# Patient Record
Sex: FEMALE | Race: ASIAN | Employment: STUDENT | URBAN - METROPOLITAN AREA
[De-identification: names, ages, dates, MRNs, and addresses within clinical notes are randomized per-mention and may not be internally consistent; named-entity substitution may affect disease eponyms.]

---

## 2019-11-23 ENCOUNTER — HOSPITAL ENCOUNTER (EMERGENCY)
Facility: CLINIC | Age: 24
Discharge: HOME | End: 2019-11-24
Attending: EMERGENCY MEDICINE | Admitting: EMERGENCY MEDICINE
Payer: COMMERCIAL

## 2019-11-23 DIAGNOSIS — L50.9 URTICARIA: ICD-10-CM

## 2019-11-23 LAB
ALBUMIN SERPL-MCNC: 3.8 G/DL (ref 3.4–5)
ALP SERPL-CCNC: 37 U/L (ref 40–150)
ALT SERPL W P-5'-P-CCNC: 19 U/L (ref 0–50)
ANION GAP SERPL CALCULATED.3IONS-SCNC: 5 MMOL/L (ref 3–14)
AST SERPL W P-5'-P-CCNC: 17 U/L (ref 0–45)
BASOPHILS # BLD AUTO: 0 10E9/L (ref 0–0.2)
BASOPHILS NFR BLD AUTO: 0.2 %
BILIRUB SERPL-MCNC: 0.2 MG/DL (ref 0.2–1.3)
BUN SERPL-MCNC: 17 MG/DL (ref 7–30)
CALCIUM SERPL-MCNC: 9.2 MG/DL (ref 8.5–10.1)
CHLORIDE SERPL-SCNC: 106 MMOL/L (ref 94–109)
CO2 SERPL-SCNC: 25 MMOL/L (ref 20–32)
CREAT SERPL-MCNC: 0.94 MG/DL (ref 0.52–1.04)
CRP SERPL-MCNC: 8.1 MG/L (ref 0–8)
DIFFERENTIAL METHOD BLD: ABNORMAL
EOSINOPHIL # BLD AUTO: 7.2 10E9/L (ref 0–0.7)
EOSINOPHIL NFR BLD AUTO: 38.2 %
ERYTHROCYTE [DISTWIDTH] IN BLOOD BY AUTOMATED COUNT: 13.8 % (ref 10–15)
ERYTHROCYTE [SEDIMENTATION RATE] IN BLOOD BY WESTERGREN METHOD: 7 MM/H (ref 0–20)
GFR SERPL CREATININE-BSD FRML MDRD: 84 ML/MIN/{1.73_M2}
GLUCOSE SERPL-MCNC: 91 MG/DL (ref 70–99)
HCT VFR BLD AUTO: 43.2 % (ref 35–47)
HGB BLD-MCNC: 14.3 G/DL (ref 11.7–15.7)
IMM GRANULOCYTES # BLD: 0.1 10E9/L (ref 0–0.4)
IMM GRANULOCYTES NFR BLD: 0.7 %
LYMPHOCYTES # BLD AUTO: 4.1 10E9/L (ref 0.8–5.3)
LYMPHOCYTES NFR BLD AUTO: 21.5 %
MCH RBC QN AUTO: 29.2 PG (ref 26.5–33)
MCHC RBC AUTO-ENTMCNC: 33.1 G/DL (ref 31.5–36.5)
MCV RBC AUTO: 88 FL (ref 78–100)
MONOCYTES # BLD AUTO: 0.5 10E9/L (ref 0–1.3)
MONOCYTES NFR BLD AUTO: 2.4 %
NEUTROPHILS # BLD AUTO: 7 10E9/L (ref 1.6–8.3)
NEUTROPHILS NFR BLD AUTO: 37 %
NRBC # BLD AUTO: 0 10*3/UL
NRBC BLD AUTO-RTO: 0 /100
PLATELET # BLD AUTO: 280 10E9/L (ref 150–450)
POTASSIUM SERPL-SCNC: 3.9 MMOL/L (ref 3.4–5.3)
PROT SERPL-MCNC: 7.6 G/DL (ref 6.8–8.8)
RBC # BLD AUTO: 4.9 10E12/L (ref 3.8–5.2)
SODIUM SERPL-SCNC: 137 MMOL/L (ref 133–144)
WBC # BLD AUTO: 18.9 10E9/L (ref 4–11)

## 2019-11-23 PROCEDURE — 25000132 ZZH RX MED GY IP 250 OP 250 PS 637: Performed by: EMERGENCY MEDICINE

## 2019-11-23 PROCEDURE — 99284 EMERGENCY DEPT VISIT MOD MDM: CPT | Mod: 25 | Performed by: EMERGENCY MEDICINE

## 2019-11-23 PROCEDURE — 80053 COMPREHEN METABOLIC PANEL: CPT | Performed by: EMERGENCY MEDICINE

## 2019-11-23 PROCEDURE — 76882 US LMTD JT/FCL EVL NVASC XTR: CPT | Mod: 26 | Performed by: EMERGENCY MEDICINE

## 2019-11-23 PROCEDURE — 76882 US LMTD JT/FCL EVL NVASC XTR: CPT | Performed by: EMERGENCY MEDICINE

## 2019-11-23 PROCEDURE — 86140 C-REACTIVE PROTEIN: CPT | Performed by: EMERGENCY MEDICINE

## 2019-11-23 PROCEDURE — 85025 COMPLETE CBC W/AUTO DIFF WBC: CPT | Performed by: EMERGENCY MEDICINE

## 2019-11-23 PROCEDURE — 85652 RBC SED RATE AUTOMATED: CPT | Performed by: EMERGENCY MEDICINE

## 2019-11-23 RX ORDER — CLONAZEPAM 0.5 MG/1
0.5 TABLET ORAL 2 TIMES DAILY PRN
COMMUNITY

## 2019-11-23 RX ORDER — HYDROXYZINE HYDROCHLORIDE 25 MG/1
25 TABLET, FILM COATED ORAL ONCE
Status: COMPLETED | OUTPATIENT
Start: 2019-11-23 | End: 2019-11-23

## 2019-11-23 RX ORDER — CYCLOBENZAPRINE HCL 5 MG
5 TABLET ORAL 3 TIMES DAILY PRN
COMMUNITY

## 2019-11-23 RX ORDER — DESONIDE 0.5 MG/G
CREAM TOPICAL 2 TIMES DAILY
COMMUNITY

## 2019-11-23 RX ADMIN — HYDROXYZINE HYDROCHLORIDE 25 MG: 25 TABLET, FILM COATED ORAL at 22:30

## 2019-11-23 ASSESSMENT — ENCOUNTER SYMPTOMS
SHORTNESS OF BREATH: 0
APPETITE CHANGE: 0
FEVER: 0
VOMITING: 0
ABDOMINAL PAIN: 0
NAUSEA: 0
CHILLS: 0
ACTIVITY CHANGE: 0
NUMBNESS: 0
DIARRHEA: 0

## 2019-11-23 NOTE — ED AVS SNAPSHOT
[facility], [place], Emergency Department  [address] [facility] [address]  Phone:  [phone]                                    Koko Daniel   MRN: [id number]    Department:  [facility], Emergency Department   Date of Visit:  11/23/2019           After Visit Summary Signature Page    I have received my discharge instructions, and my questions have been answered. I have discussed any challenges I see with this plan with the nurse or doctor.    ..........................................................................................................................................  Patient/Patient Representative Signature      ..........................................................................................................................................  Patient Representative Print Name and Relationship to Patient    ..................................................               ................................................  Date                                   Time    ..........................................................................................................................................  Reviewed by Signature/Title    ...................................................              ..............................................  Date                                               Time          22EPIC Rev 08/18

## 2019-11-24 ENCOUNTER — TELEPHONE (OUTPATIENT)
Dept: DERMATOLOGY | Facility: CLINIC | Age: 24
End: 2019-11-24

## 2019-11-24 VITALS
HEART RATE: 74 BPM | OXYGEN SATURATION: 100 % | TEMPERATURE: 98.6 F | RESPIRATION RATE: 16 BRPM | WEIGHT: 146 LBS | SYSTOLIC BLOOD PRESSURE: 118 MMHG | DIASTOLIC BLOOD PRESSURE: 75 MMHG

## 2019-11-24 RX ORDER — HYDROXYZINE HYDROCHLORIDE 25 MG/1
25 TABLET, FILM COATED ORAL EVERY 8 HOURS PRN
Qty: 15 TABLET | Refills: 0 | Status: SHIPPED | OUTPATIENT
Start: 2019-11-24

## 2019-11-24 NOTE — DISCHARGE INSTRUCTIONS
Please make an appointment to follow up with Dermatology Clinic (phone: [phone]) this week for recheck and possible biopsy.  They will call you to schedule.      You may use hydroxyzine as needed for itching.  This may make you drowsy so use with caution during the day and do not drive after taking.  You may alternatively use cetirizine during the day for itching and hydroxyzine in the evening.      Apply topical Sarna cream followed by eucerin to keep skin well hydrated.  Avoid any soaps or detergents with perfumes or dyes.      If you have any worsening symptoms including increasing rash, fever, shortness of breath, or other concerns, return to the emergency department for re-evaluation.

## 2019-11-24 NOTE — TELEPHONE ENCOUNTER
As resident on call, I received a page from the ED provider Dr. Lo Allen regarding Ms. Daniel, a 25 yo female presenting to the ED for a rash, concern for urticarial vasculitis. Communication via telephone, with the following history obtained from  ED provider and chart review. The patient has a history of eczema but today developed urticarial appearing plaques on the bilateral lower legs that the patient described as painful and burning. Patient had no signs/symptoms of mucositis, facial swelling, or diffuse rash elsewhere. Patient did not endorse any other systemic symptoms, including abdominal pain. She was otherwise in her normal state of health. Patient was afebrile, vitals signs stable. CBC, CMP, CRP, ESR remarkable only for WBC 18.9 with absolute eosinophilia. Ultrasound of soft tissue pending. Hydroxyzine administered in ED with improvement of rash. For the rash, I recommended follow up in dermatology clinic early next week (request placed) and continuation of oral antihistamines. No signs of dermatologic condition requiring urgent dermatologic evaluation based on information provided. Deferred other work-up, treatment and plan to ED provider. Recommend patient return to the ED if patient develops signs of mucositis, facial swelling, or other systemic symptoms.    Marina Malcolm PGY2  Dermatology Resident  pager

## 2019-11-24 NOTE — ED PROVIDER NOTES
[facility] EMERGENCY DEPARTMENT ([facility])  11/23/19   History     Chief Complaint   Patient presents with     Hives     The history is provided by the patient and medical records.     Koko Daniel is a 24 year old female who has a PMHx of eczema, who presents to the Emergency Department for evaluation of diffuse pruritis and lower extremity rash.  Patient has had a hives symptoms for approximately 1.5 weeks starting with itchiness of left leg (also had few weeks of left sole itchiness). On 11/19/19, she noted a rash on her left leg developing and that night was unable to sleep due to itchiness. The next day patient tried benadryl which was helpful in managing her itching initially. However, her rash continued to develop and has spread as it is now also present on right leg. Today the rash on the lower legs with more painful prompted her ED visit.  Has not noted mouth sores, tongue swelling, shortness of breath, nausea or vomiting, or abdominal pain. No fevers or chills.  Denies any rhinorrhea, sore throat or cough.  Denies any dysuria, frequency, hematuria or blood in the stools. She is able to fully range the joints of the lower extremity.      History of hives as a child and reports her eosinophil count was quite high at the time (per sister's report) without clear etiology. 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 She did try a new lotion for eczema recently but states hives were present before she started using this lotion. Has also started using more of a new unscented detergent from [employer].  Patient has not had new medication changes and continues to take: cyclobenzaprine for TMJ, OCP and is prescribed clonazepam for anxiety.  She does live in an apartment with her fiance who has not had any similar rash.      I have reviewed the Medications, Allergies, Past Medical and Surgical History,  and Social History in the Epic system.    History reviewed. No pertinent past medical history.    No past surgical history on file.    No family history on file.    Social History     Tobacco Use     Smoking status: Not on file   Substance Use Topics     Alcohol use: Not on file       No current facility-administered medications for this encounter.      Current Outpatient Medications   Medication     clonazePAM (KLONOPIN) 0.5 MG tablet     cyclobenzaprine (FLEXERIL) 5 MG tablet     desonide (DESOWEN) 0.05 % external cream     norethin-eth estradiol-fe (GILDESS 24 FE) 1-20 MG-MCG(24) tablet      No Known Allergies     Review of Systems   Constitutional: Negative for activity change, appetite change, chills and fever.   HENT: Negative for mouth sores.    Respiratory: Negative for shortness of breath.    Cardiovascular: Negative for chest pain.   Gastrointestinal: Negative for abdominal pain, diarrhea, nausea and vomiting.   Skin: Positive for rash (lower extremities (L>R)).   Neurological: Negative for numbness.   All other systems reviewed and are negative.      Physical Exam   /75   Pulse 74   Temp 98.6  F (37  C) (Oral)   Resp 16   Wt 66.2 kg (146 lb)   LMP 11/02/2019 (Exact Date)   SpO2 100%       Physical Exam  General: patient is alert and oriented, well apparing and in no acute distress   Head: atraumatic and normocephalic   EENT: moist mucus membranes without tonsillar erythema or exudates, no oral lesions or ulceration, no tongue swelling, pupils round and reactive, no injection of conjunctiva  Neck: supple with full ROM  Cardiovascular: regular rate and rhythm, extremities warm and well perfused, no lower extremity edema, 2+ DP pulses bilaterally  Pulmonary: lungs clear to auscultation bilaterally   Abdomen: soft, non-tender, non-distended  Musculoskeletal: normal range of motion of bilateral hips, knees and ankles without joint swelling or redness  Neurological: alert and oriented, moving all  extremities symmetrically, gait normal   Skin: warm, dry, numerous excoriations on the back, multiple small blanchable macules and papules on the upper back, left upper abdomen and bilateral lower extremities with confluence in the distal lower extremities                            ED Course   9:35 PM  The patient was seen and examined by Lo Allen MD in Room ED05.     10:02 PM Patient rash also evaluated by Dr. Morin.      Procedures             Critical Care time:  none             Labs Ordered and Resulted from Time of ED Arrival Up to the Time of Departure from the ED - No data to display         Assessments & Plan (with Medical Decision Making)   This is a 24-year-old female with a history of anxiety, eczema and TMJ who presents to the emergency department with concern for a rash.  She is hemodynamically stable, afebrile and well appearing.  She does have blanching raised erythema on the bilateral lower extremities as well as a  lesions on her back and abdomen.  She has been using a new lotion recently from [employer] due to have itching.  She subsequently developed hives following this.  Certainly this may have been a trigger versus being under a significant amount of stress recently versus weather changes. She does however report that the rash is somewhat painful and potential for urticarial vasculitis is a consideration.  She did have baseline labs which show her LFTs and creatinine are WNL.  She does have an elevated WBC with a significant eosinophil predominance.  The remainder of her inflammatory markers are not significantly elevated.  On exam she does have diffuse symptoms bilateral lower extremity symptoms that does not appear consistent with a secondary cellulitis or necrotizing infection.   She does not have any systemic signs of infectious symptoms and does not have any systemic signs of allergic reaction.  Denies any other localizing infectious symptoms to trigger rash.  She is able to fully range  all joints with low suspicion for septic joint.  She was given hydroxyzine and observed in the ED without any progression of the distal lower extremity rash and actually appears improved on re-evaluation.  I did discuss with dermatology given the potential for an urticarial vasculitis.  At this time recommended holding steroids and will plan to see patient this week in clinic.  Will continue treatment with antihistamines and Sarna cream followed by this emollient.  She was given close return precautions for the emergency department is she should develop progressive symptoms, fevers, or any other worsening symptoms and voiced understanding.    I have reviewed the nursing notes.    I have reviewed the findings, diagnosis, plan and need for follow up with the patient.    Discharge Medication List as of 11/24/2019 12:20 AM      START taking these medications    Details   hydrOXYzine (ATARAX) 25 MG tablet Take 1 tablet (25 mg) by mouth every 8 hours as needed for itching, Disp-15 tablet, R-0, Local Print             Final diagnoses:   Urticaria     Richie BURNETTE, am serving as a trained medical scribe to document services personally performed by Lo Allen MD, based on the provider's statements to me.   Lo BURNETTE MD, was physically present and have reviewed and verified the accuracy of this note documented by Richie Tomas.     11/23/2019   [facility], [place], EMERGENCY DEPARTMENT     Lo Allen MD  11/24/19 0240

## 2019-11-24 NOTE — ED TRIAGE NOTES
Pt has a rash on her left lower leg and upper thigh that she says is spreading up her leg. She says it hurts and is warm and itchy. She has a hx of eczema.  She identifies areas of discoloration that are not normal. She has tried benadryl and lotion for eczema. She is alert oriented and ambulatory. On Tuesday 11/19 the hives got so itchy it woke her up so she tried benadryl and it helped at first

## 2019-11-26 ENCOUNTER — OFFICE VISIT (OUTPATIENT)
Dept: DERMATOLOGY | Facility: CLINIC | Age: 24
End: 2019-11-26
Payer: COMMERCIAL

## 2019-11-26 DIAGNOSIS — L50.9 URTICARIA: ICD-10-CM

## 2019-11-26 DIAGNOSIS — L50.9 URTICARIA: Primary | ICD-10-CM

## 2019-11-26 LAB — TSH SERPL DL<=0.005 MIU/L-ACNC: 2.64 MU/L (ref 0.4–4)

## 2019-11-26 ASSESSMENT — PAIN SCALES - GENERAL: PAINLEVEL: NO PAIN (0)

## 2019-11-26 NOTE — LETTER
11/26/2019       RE: Koko Daniel  [address]  [place] [address]     Dear Colleague,    Thank you for referring your patient, Koko Daniel, to the [facility] DERMATOLOGY at [facility]. Please see a copy of my visit note below.    [facility] Dermatology Note      Dermatology Problem List:  1. Hx eczema  2. Acute urticaria  - Allegra 180 mg bid  - Zyrtec 10 mg bid    Encounter Date: Nov 26, 2019    CC:   No chief complaint on file.        History of Present Illness:  Ms. Koko Daniel is a 24 year old female with history of anxiety and eczema who presents for evaluation of painful urticarial rash. On 11/2319, the patient presented to the ED for new onset rash. On 11/19/19, she noted a rash on her left leg developing and that night was unable to sleep due to itchiness. The next day patient tried benadryl which was helpful in managing her itching initially. However, her rash continued to develop and has spread as it is now also present on right leg.  The rash on the lower legs with more painful prompted her ED visit on 11/23. She had a few urticarial lesions on her trunk as well. In the ED, she was afebrile and hemodynamically stable.  Her WBC was elevated to 18.9 with absolute neutrophilia of 7.2. LFT's and CR wnl in ED.  She denied other systemic symptoms in the ED. Hive-like rash thought to be triggered by new lotion vs stress vs weather changes; however associated pain raised concern for urticarial vasculitis per ED provider. She was given hydroxyzine in the ED with improvement in the rash. Patient was discharged from the ED with prescription for hydroxyzine and desonide with plan for close follow up in dermatology clinic.    Patient denies painful lesions today. She states that lesions that were painful on her lower extremities resolved in <24 hours with hydroxyzine without recurrence. She has had lesions that persisted for >24 hours on her  "arms.  She does endorse some swelling and \"fullness\" of the ankles.    No history of lupus, Hep B/C. No new medications aside from addition of flexeril recent within past few months. Denies recent illness.    Patient denies facial swelling, ocular, nasal and oral sores.    Denies other allergies. Hx eczema, asthma and high eosinophils per patient report.    Past Medical History:   There is no problem list on file for this patient.    No past medical history on file.  No past surgical history on file.    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     Family History:  No family history on file.    Medications:  Current Outpatient Medications   Medication Sig Dispense Refill     clonazePAM (KLONOPIN) 0.5 MG tablet Take 0.5 mg by mouth 2 times daily as needed for anxiety       cyclobenzaprine (FLEXERIL) 5 MG tablet Take 5 mg by mouth 3 times daily as needed for muscle spasms       desonide (DESOWEN) 0.05 % external cream Apply topically 2 times daily       hydrOXYzine (ATARAX) 25 MG tablet Take 1 tablet (25 mg) by mouth every 8 hours as needed for itching 15 tablet 0     norethin-eth estradiol-fe (GILDESS 24 FE) 1-20 MG-MCG(24) tablet Take 1 tablet by mouth daily          No Known Allergies      Review of Systems:  -Skin Establ Pt: The patient denies any new rash, pruritus, or lesions that are symptomatic, changing or bleeding, except as per HPI.  -Constitutional: Otherwise feeling well today, in usual state of health.  -HEENT: Patient denies nonhealing oral sores.  -Skin: As above in HPI. No additional skin concerns.    Physical exam:  Vitals: [facility] 11/02/2019 (Exact Date)   GEN: This is a well developed, well-nourished female in no acute distress, in a pleasant mood.    SKIN: A focused skin exam was performed. The exam included the head/face, neck, both arms, chest, back, abdomen, both legs, digits and/or nails.   -Cochran skin type: III  -On the lower legs and forearms there are few " very faint pink patches  -On the right dorsal hand/forearm and dorsal left foot there are eczematous patches  -No active urticarial lesions  -No appreciable swelling or erythema in joints  -No other lesions of concern on areas examined.     Impression/Plan:  1. Acute urticaria: Patient's presentation most consistent with acute urticaria as vast majority of lesions are preset <24 hours and are not painful. Urticarial vasculitis or neutrophilic urticaria possible, but not high suspicion for this diagnosis at this time as lesions not persistent, ESR/CRP from recent ED visit not elevated, no joint pain, no fevers. No hx of triggering underlying disease such as lupus, HepB/C. No active lesions on exam of today. Discussed with patient the etiology, natural coarse and treatment options, including that in the vast majorty of people with urticaria, it will resolve spontaneously in 6 weeks. Discussed common triggers including medications, infections, alcohol use, foods, and cold weather. No identifiable trigger today.     Check TSH    Instructed patient to take Allegra 180 mg two times per day with Zyrtec 10 mg two times per day (ok to reduce to daily if noting excessive sedation)    Discussed with patient that aspirin/NSAIDS may be potentiating the hives, but still okay to take    Consider biopsy at next appointment, if no improvement        CC Md [facility], MD  [address] [place] [address] on close of this encounter.    Follow-up in 3-4 weeks    Dr. Mcintyre staffed the patient.    Staff Involved:  Resident(Marina Malcolm PGY2)/Staff    I have seen and examined this patient and agree with the assessment and plan as documented in the resident's note.    Kamari Mcintyre MD  Dermatology Attending

## 2019-11-26 NOTE — PROGRESS NOTES
"[facility] Dermatology Note      Dermatology Problem List:  1. Hx eczema  2. Acute urticaria  - Allegra 180 mg bid  - Zyrtec 10 mg bid    Encounter Date: Nov 26, 2019    CC:   No chief complaint on file.        History of Present Illness:  Ms. Koko Daniel is a 24 year old female with history of anxiety and eczema who presents for evaluation of painful urticarial rash. On 11/2319, the patient presented to the ED for new onset rash. On 11/19/19, she noted a rash on her left leg developing and that night was unable to sleep due to itchiness. The next day patient tried benadryl which was helpful in managing her itching initially. However, her rash continued to develop and has spread as it is now also present on right leg. The rash on the lower legs with more painful prompted her ED visit on 11/23. She had a few urticarial lesions on her trunk as well. In the ED, she was afebrile and hemodynamically stable.  Her WBC was elevated to 18.9 with absolute neutrophilia of 7.2. LFT's and CR wnl in ED.  She denied other systemic symptoms in the ED. Hive-like rash thought to be triggered by new lotion vs stress vs weather changes; however associated pain raised concern for urticarial vasculitis per ED provider. She was given hydroxyzine in the ED with improvement in the rash. Patient was discharged from the ED with prescription for hydroxyzine and desonide with plan for close follow up in dermatology clinic.    Patient denies painful lesions today. She states that lesions that were painful on her lower extremities resolved in <24 hours with hydroxyzine without recurrence. She has had lesions that persisted for >24 hours on her arms.  She does endorse some swelling and \"fullness\" of the ankles.    No history of lupus, Hep B/C. No new medications aside from addition of flexeril recent within past few months. Denies recent illness.    Patient denies facial swelling, ocular, nasal and oral sores.    Denies other " allergies. Hx eczema, asthma and high eosinophils per patient report.    Past Medical History:   There is no problem list on file for this patient.    No past medical history on file.  No past surgical history on file.    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     Family History:  No family history on file.    Medications:  Current Outpatient Medications   Medication Sig Dispense Refill     clonazePAM (KLONOPIN) 0.5 MG tablet Take 0.5 mg by mouth 2 times daily as needed for anxiety       cyclobenzaprine (FLEXERIL) 5 MG tablet Take 5 mg by mouth 3 times daily as needed for muscle spasms       desonide (DESOWEN) 0.05 % external cream Apply topically 2 times daily       hydrOXYzine (ATARAX) 25 MG tablet Take 1 tablet (25 mg) by mouth every 8 hours as needed for itching 15 tablet 0     norethin-eth estradiol-fe (GILDESS 24 FE) 1-20 MG-MCG(24) tablet Take 1 tablet by mouth daily          No Known Allergies      Review of Systems:  -Skin Establ Pt: The patient denies any new rash, pruritus, or lesions that are symptomatic, changing or bleeding, except as per HPI.  -Constitutional: Otherwise feeling well today, in usual state of health.  -HEENT: Patient denies nonhealing oral sores.  -Skin: As above in HPI. No additional skin concerns.    Physical exam:  Vitals: [facility] 11/02/2019 (Exact Date)   GEN: This is a well developed, well-nourished female in no acute distress, in a pleasant mood.    SKIN: A focused skin exam was performed. The exam included the head/face, neck, both arms, chest, back, abdomen, both legs, digits and/or nails.   -Cochran skin type: III  -On the lower legs and forearms there are few very faint pink patches  -On the right dorsal hand/forearm and dorsal left foot there are eczematous patches  -No active urticarial lesions  -No appreciable swelling or erythema in joints  -No other lesions of concern on areas examined.     Impression/Plan:  1. Acute urticaria: Patient's  presentation most consistent with acute urticaria as vast majority of lesions are preset <24 hours and are not painful. Urticarial vasculitis or neutrophilic urticaria possible, but not high suspicion for this diagnosis at this time as lesions not persistent, ESR/CRP from recent ED visit not elevated, no joint pain, no fevers. No hx of triggering underlying disease such as lupus, HepB/C. No active lesions on exam of today. Discussed with patient the etiology, natural coarse and treatment options, including that in the vast majorty of people with urticaria, it will resolve spontaneously in 6 weeks. Discussed common triggers including medications, infections, alcohol use, foods, and cold weather. No identifiable trigger today.     Check TSH    Instructed patient to take Allegra 180 mg two times per day with Zyrtec 10 mg two times per day (ok to reduce to daily if noting excessive sedation)    Discussed with patient that aspirin/NSAIDS may be potentiating the hives, but still okay to take    Consider biopsy at next appointment, if no improvement        CC Md [facility], MD  [address] [place] [address] on close of this encounter.    Follow-up in 3-4 weeks    Dr. Mcintyre staffed the patient.    Staff Involved:  Resident(Marina Malcolm PGY2)/Staff    I have seen and examined this patient and agree with the assessment and plan as documented in the resident's note.    Kamari Mcintyre MD  Dermatology Attending

## 2019-11-26 NOTE — NURSING NOTE
Dermatology Rooming Note    Koko Daniel's goals for this visit include:   Chief Complaint   Patient presents with     Derm Problem     Ronel is here today for a possible vasculitis follow up      KIMBERLI Stone

## 2019-11-26 NOTE — PATIENT INSTRUCTIONS
"We suspect you have acute urticaria, we recommend you take Allegra 180 mg two times per day and Zyrtec 10 mg two times per day. You may have a few \"break though\" hives even if you take the above medications    We will also check your thyroid today    We do not suspect that you have urticarial vasculitis or cellulitis today.   "

## 2020-01-14 ENCOUNTER — OFFICE VISIT (OUTPATIENT)
Dept: DERMATOLOGY | Facility: CLINIC | Age: 25
End: 2020-01-14
Payer: COMMERCIAL

## 2020-01-14 DIAGNOSIS — L50.8 ACUTE URTICARIA: Primary | ICD-10-CM

## 2020-01-14 ASSESSMENT — PAIN SCALES - GENERAL: PAINLEVEL: NO PAIN (0)

## 2020-01-14 NOTE — PROGRESS NOTES
CHIEF COMPLAINT:  Followup on acute urticaria.      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  She was seen in our clinic for the first time on 11/26/2019, at which time she was assessed to have acute idiopathic urticaria.  At that time we checked a TSH, which was within normal limits, and recommended Allegra 180 mg twice daily as well as Zyrtec 10 mg twice daily.  She initially took these double doses for several weeks and noticed good improvement.  Over the past few weeks, she had tapered off most of her antihistamines, but while she was in [place], she noticed flaring of her hives on her lower extremities when she was exercising and putting pressure on her knees.  Since then, she has resumed taking both Zyrtec and Allegra.  She has tolerated these generally well and has currently been hive free for the last 1-2 weeks, but is worried about recurrence.  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  She denies any recent lip or throat swelling.      REVIEW OF SYSTEMS:  See HPI.  No recent fevers or chills.  No anaphylaxis-type symptoms or angioedema.      PHYSICAL EXAMINATION:   GENERAL:  This is a well-appearing, well-nourished female with a normal mood and affect who is oriented x3.   SKIN:  A cutaneous exam of the head, neck and bilateral upper extremities was performed and is unremarkable.      ASSESSMENT AND PLAN:  Urticaria, acute to subacute.  She was reassured today that she is still effectively in the acute phase, as younger patients may have acute idiopathic urticaria for up to 3 months before we consider them to have chronic refractory hives.  She is an antihistamine responder, and she was reassured in this regard.  I suggested to her today as she has had a recent flare that she restart both Allegra and Zyrtec.  If she is  concerned about sedation, she may start with Zyrtec 10 mg once daily instead of twice daily.  I encouraged her to continue these until she is hive free for a continuous 2 week period and then begin reducing by 1 pill every 2 weeks.  We discussed the usual triggers for acute urticaria, including cold weather, cholinergic- and pressure-induced variance, as well as histamine-releasing foods and alcohol.  As she is doing well today, we will plan to have her follow up in our clinic on an as-needed basis.       Kamari Mcintyre MD  Dermatology Attending

## 2020-01-14 NOTE — LETTER
1/14/2020       RE: Koko Daniel  [address]  [facility] [address]     Dear Colleague,    Thank you for referring your patient, Koko Daniel, to the [facility] DERMATOLOGY at [facility]. Please see a copy of my visit note below.    CHIEF COMPLAINT:  Followup on acute urticaria.      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  She was seen in our clinic for the first time on 11/26/2019, at which time she was assessed to have acute idiopathic urticaria.  At that time we checked a TSH, which was within normal limits, and recommended Allegra 180 mg twice daily as well as Zyrtec 10 mg twice daily.  She initially took these double doses for several weeks and noticed good improvement.  Over the past few weeks, she had tapered off most of her antihistamines, but while she was in [place], she noticed flaring of her hives on her lower extremities when she was exercising and putting pressure on her knees.  Since then, she has resumed taking both Zyrtec and Allegra.  She has tolerated these generally well and has currently been hive free for the last 1-2 weeks, but is worried about recurrence.  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  She denies any recent lip or throat swelling.      REVIEW OF SYSTEMS:  See HPI.  No recent fevers or chills.  No anaphylaxis-type symptoms or angioedema.      PHYSICAL EXAMINATION:   GENERAL:  This is a well-appearing, well-nourished female with a normal mood and affect who is oriented x3.   SKIN:  A cutaneous exam of the head, neck and bilateral upper extremities was performed and is unremarkable.      ASSESSMENT AND PLAN:  Urticaria, acute to subacute.  She was reassured today that she is still effectively in the acute phase, as younger patients may have  acute idiopathic urticaria for up to 3 months before we consider them to have chronic refractory hives.  She is an antihistamine responder, and she was reassured in this regard.  I suggested to her today as she has had a recent flare that she restart both Allegra and Zyrtec.  If she is concerned about sedation, she may start with Zyrtec 10 mg once daily instead of twice daily.  I encouraged her to continue these until she is hive free for a continuous 2 week period and then begin reducing by 1 pill every 2 weeks.  We discussed the usual triggers for acute urticaria, including cold weather, cholinergic- and pressure-induced variance, as well as histamine-releasing foods and alcohol.  As she is doing well today, we will plan to have her follow up in our clinic on an as-needed basis.       Kamari Mcintyre MD  Dermatology Attending

## 2020-01-27 PROBLEM — L50.8 ACUTE URTICARIA: Status: ACTIVE | Noted: 2019-11-26

## 2020-03-11 ENCOUNTER — HEALTH MAINTENANCE LETTER (OUTPATIENT)
Age: 25
End: 2020-03-11

## 2021-01-04 ENCOUNTER — HEALTH MAINTENANCE LETTER (OUTPATIENT)
Age: 26
End: 2021-01-04

## 2021-04-25 ENCOUNTER — HEALTH MAINTENANCE LETTER (OUTPATIENT)
Age: 26
End: 2021-04-25

## 2021-10-10 ENCOUNTER — HEALTH MAINTENANCE LETTER (OUTPATIENT)
Age: 26
End: 2021-10-10

## 2022-05-22 ENCOUNTER — HEALTH MAINTENANCE LETTER (OUTPATIENT)
Age: 27
End: 2022-05-22

## 2022-09-18 ENCOUNTER — HEALTH MAINTENANCE LETTER (OUTPATIENT)
Age: 27
End: 2022-09-18

## 2023-06-04 ENCOUNTER — HEALTH MAINTENANCE LETTER (OUTPATIENT)
Age: 28
End: 2023-06-04